# Patient Record
Sex: MALE | HISPANIC OR LATINO | Employment: FULL TIME | URBAN - METROPOLITAN AREA
[De-identification: names, ages, dates, MRNs, and addresses within clinical notes are randomized per-mention and may not be internally consistent; named-entity substitution may affect disease eponyms.]

---

## 2024-06-18 ENCOUNTER — OFFICE VISIT (OUTPATIENT)
Dept: URGENT CARE | Facility: URGENT CARE | Age: 59
End: 2024-06-18
Payer: COMMERCIAL

## 2024-06-18 VITALS
TEMPERATURE: 97.3 F | OXYGEN SATURATION: 98 % | RESPIRATION RATE: 16 BRPM | DIASTOLIC BLOOD PRESSURE: 75 MMHG | SYSTOLIC BLOOD PRESSURE: 120 MMHG | WEIGHT: 140.6 LBS | HEART RATE: 80 BPM

## 2024-06-18 DIAGNOSIS — S39.012A BACK STRAIN, INITIAL ENCOUNTER: Primary | ICD-10-CM

## 2024-06-18 PROCEDURE — 99203 OFFICE O/P NEW LOW 30 MIN: CPT | Performed by: EMERGENCY MEDICINE

## 2024-06-18 RX ORDER — TAMSULOSIN HYDROCHLORIDE 0.4 MG/1
0.4 CAPSULE ORAL DAILY
COMMUNITY
Start: 2023-11-30

## 2024-06-18 RX ORDER — CYCLOBENZAPRINE HCL 5 MG
5 TABLET ORAL 3 TIMES DAILY PRN
Qty: 21 TABLET | Refills: 0 | Status: SHIPPED | OUTPATIENT
Start: 2024-06-18

## 2024-06-18 RX ORDER — DEXTROAMPHETAMINE SACCHARATE, AMPHETAMINE ASPARTATE, DEXTROAMPHETAMINE SULFATE AND AMPHETAMINE SULFATE 5; 5; 5; 5 MG/1; MG/1; MG/1; MG/1
20 TABLET ORAL
COMMUNITY
Start: 2024-06-30

## 2024-06-18 RX ORDER — SILDENAFIL CITRATE 20 MG/1
80 TABLET ORAL
COMMUNITY
Start: 2023-10-13

## 2024-06-18 RX ORDER — LIDOCAINE 50 MG/G
1 PATCH TOPICAL EVERY 24 HOURS
Qty: 10 PATCH | Refills: 0 | Status: SHIPPED | OUTPATIENT
Start: 2024-06-18

## 2024-06-18 RX ORDER — CICLOPIROX 80 MG/ML
SOLUTION TOPICAL
COMMUNITY

## 2024-06-18 NOTE — PROGRESS NOTES
"Assessment & Plan     Diagnosis:    ICD-10-CM    1. Back strain, initial encounter  S39.012A cyclobenzaprine (FLEXERIL) 5 MG tablet     lidocaine (LIDODERM) 5 % patch     Physical Therapy  Referral            Medical Decision Making:  Nishant Vasquez is a 59 year old male who presents for evaluation of back pain that started after he picked up heavy garbage bag last week.  He has no notable history of back pain in the past.  The patient did not sustain any trauma, therefore x-rays are not necessary due to the low likelihood of fracture or subluxation.      No red flag symptoms to suggest ER evaluation with CT and/or MRI is indicated at this point.  There is no clinical evidence or history concerning for cauda equina syndrome, discitis, spinal/epidural space hematoma or epidural abscess or other worrisome etiology. The neurological exam is normal and the patient's symptoms seem consistent with musculoskeletal issues and significant muscle spasm.    The patient will be discharged with pain medications to use as directed. Ice or heat to the back and stretching exercises. No heavy lifting, bending or twisting. Go to the ER if increasing pain, numbness, weakness, or bowel or bladder dysfunction. Patient was advised to schedule follow-up with their primary doctor within 3 days to re-assess symptoms. Questions answered.    Moo Kenney PA-C  [facility]    Subjective     Nishant Vasquez is a 59 year old male who presents to clinic today for the following health issues:  Chief Complaint   Patient presents with    Back Pain     Back pain started with lower back pain and moved to the right lower back pain to the hip onset about a week ago.       HPI  Patient present with back pain that started after picking up a heavy garbage bag. This has been getting progressively worse over the past few days. Pain is described as \"spasm\" and \"tight\" and is in the lower back; R > L side. does not radiate down " legs. Patient has been walking; notes pain is worse with bending/twisting and better with Advil and stretching. No numbness or weakness, bowel or bladder incontinence, recent trauma, fever, night sweats, abdominal pain or other concerns. Patient has no history of spine/back surgery.      Review of Systems    See HPI    Objective      Vitals: /75 (BP Location: Left arm, Patient Position: Sitting, Cuff Size: Adult Regular)   Pulse 80   Temp 97.3  F (36.3  C) (Tympanic)   Resp 16   Wt 63.8 kg (140 lb 9.6 oz)   SpO2 98%       Patient Vitals for the past 24 hrs:   BP Temp Temp src Pulse Resp SpO2 Weight   06/18/24 1015 120/75 97.3  F (36.3  C) Tympanic 80 16 98 % 63.8 kg (140 lb 9.6 oz)       Vital signs reviewed by: Moo Kenney PA-C    Physical Exam   Constitutional: Alert and active. Mild acute distress.  GI: Abdomen is soft and non-tender throughout. No CVA tenderness bilaterally.  Musculoskeletal: Tenderness to palpation in the right lumbar paraspinal musculature. No midline T or L spine tenderness to palpation. Normal range of motion of the lower extremities.  Neurological: Alert and oriented x3. Strength and sensation is intact and symmetric in the bilateral lower extremities.  Skin: No rash noted on visualized skin on back.       Moo Kenney PA-C, June 18, 2024

## 2024-12-15 ENCOUNTER — HEALTH MAINTENANCE LETTER (OUTPATIENT)
Age: 59
End: 2024-12-15